# Patient Record
Sex: FEMALE | Race: BLACK OR AFRICAN AMERICAN | Employment: UNEMPLOYED | URBAN - METROPOLITAN AREA
[De-identification: names, ages, dates, MRNs, and addresses within clinical notes are randomized per-mention and may not be internally consistent; named-entity substitution may affect disease eponyms.]

---

## 2018-04-06 ENCOUNTER — APPOINTMENT (OUTPATIENT)
Dept: GENERAL RADIOLOGY | Facility: HOSPITAL | Age: 38
End: 2018-04-06

## 2018-04-06 ENCOUNTER — HOSPITAL ENCOUNTER (EMERGENCY)
Facility: HOSPITAL | Age: 38
Discharge: HOME | End: 2018-04-06

## 2018-04-06 VITALS
TEMPERATURE: 98 F | WEIGHT: 130 LBS | OXYGEN SATURATION: 98 % | DIASTOLIC BLOOD PRESSURE: 71 MMHG | HEART RATE: 72 BPM | BODY MASS INDEX: 19.26 KG/M2 | SYSTOLIC BLOOD PRESSURE: 114 MMHG | RESPIRATION RATE: 20 BRPM | HEIGHT: 69 IN

## 2018-04-06 DIAGNOSIS — J40 BRONCHITIS: Primary | ICD-10-CM

## 2018-04-06 DIAGNOSIS — Z72.0 TOBACCO ABUSE: ICD-10-CM

## 2018-04-06 PROCEDURE — 99284 EMERGENCY DEPT VISIT MOD MDM: CPT

## 2018-04-06 PROCEDURE — 94640 AIRWAY INHALATION TREATMENT: CPT

## 2018-04-06 PROCEDURE — 71046 X-RAY EXAM CHEST 2 VIEWS: CPT

## 2018-04-06 RX ORDER — PREDNISONE 20 MG/1
60 TABLET ORAL ONCE
Status: COMPLETED | OUTPATIENT
Start: 2018-04-06 | End: 2018-04-06

## 2018-04-06 RX ORDER — DOXYCYCLINE 100 MG/1
100 CAPSULE ORAL 2 TIMES DAILY
Qty: 20 CAPSULE | Refills: 0 | Status: SHIPPED | OUTPATIENT
Start: 2018-04-06 | End: 2018-04-16

## 2018-04-06 RX ORDER — ALBUTEROL SULFATE 90 UG/1
2 AEROSOL, METERED RESPIRATORY (INHALATION) EVERY 4 HOURS PRN
Qty: 1 INHALER | Refills: 0 | Status: SHIPPED | OUTPATIENT
Start: 2018-04-06 | End: 2018-05-06

## 2018-04-06 RX ORDER — DOXYCYCLINE HYCLATE 100 MG/1
100 CAPSULE ORAL ONCE
Status: COMPLETED | OUTPATIENT
Start: 2018-04-06 | End: 2018-04-06

## 2018-04-06 RX ORDER — IPRATROPIUM BROMIDE AND ALBUTEROL SULFATE 2.5; .5 MG/3ML; MG/3ML
3 SOLUTION RESPIRATORY (INHALATION) ONCE
Status: COMPLETED | OUTPATIENT
Start: 2018-04-06 | End: 2018-04-06

## 2018-04-06 RX ORDER — PREDNISONE 20 MG/1
60 TABLET ORAL DAILY
Qty: 12 TABLET | Refills: 0 | Status: SHIPPED | OUTPATIENT
Start: 2018-04-06 | End: 2018-04-10

## 2018-04-06 NOTE — ED PROVIDER NOTES
Patient Seen in: [facility] Emergency Department    History   Patient presents with:  Dyspnea GONZALO SOB (respiratory)    Stated Complaint: GONZALO    HPI    This pleasant 34-year-old with history of asthma and mild allergies to smoke cigarettes presents t lymphadenopathy, no meningismus, no thyromegaly    Chest: clear breath sounds with scant wheezing bilaterally with dry cough.     Heart: Regular rate and rhythm     Abdomen: Soft, nondistended,  No tenderness    Extremities: Warm, well perfused, without anibal daily., Print Script, Disp-12 tablet, R-0    Albuterol Sulfate  (90 Base) MCG/ACT Inhalation Aero Soln  Inhale 2 puffs into the lungs every 4 (four) hours as needed for Wheezing., Normal, Disp-1 Inhaler, R-0    Spacer/Aero Chamber Mouthpiece Does no

## 2018-04-06 NOTE — ED INITIAL ASSESSMENT (HPI)
PT AWOKE WITH GONZALO. STATES SHE CANT CATCH HER BREATH. PT WITH HX OF ASTHMA AS A CHILD.  PT DENIES URI SX.

## (undated) NOTE — ED AVS SNAPSHOT
Erika Asencio   MRN: [id number]    Department:  [facility] Emergency Department   Date of Visit:  4/6/2018           Disclosure     Insurance plans vary and the physician(s) referred by the ER may not be covered by your plan.  Please cont tell this physician (or your personal doctor if your instructions are to return to your personal doctor) about any new or lasting problems. The primary care or specialist physician will see patients referred from the [facility] Emergency Department.  Shelton Lombard